# Patient Record
Sex: FEMALE | Race: WHITE | Employment: UNEMPLOYED | URBAN - METROPOLITAN AREA
[De-identification: names, ages, dates, MRNs, and addresses within clinical notes are randomized per-mention and may not be internally consistent; named-entity substitution may affect disease eponyms.]

---

## 2023-08-22 ENCOUNTER — HOSPITAL ENCOUNTER (OUTPATIENT)
Age: 68
Discharge: HOME | End: 2023-08-22
Payer: COMMERCIAL

## 2023-08-22 ENCOUNTER — APPOINTMENT (OUTPATIENT)
Dept: GENERAL RADIOLOGY | Age: 68
End: 2023-08-22
Attending: PHYSICIAN ASSISTANT
Payer: COMMERCIAL

## 2023-08-22 VITALS
SYSTOLIC BLOOD PRESSURE: 150 MMHG | TEMPERATURE: 99 F | WEIGHT: 195 LBS | RESPIRATION RATE: 18 BRPM | HEART RATE: 72 BPM | DIASTOLIC BLOOD PRESSURE: 61 MMHG | BODY MASS INDEX: 31.34 KG/M2 | HEIGHT: 66 IN | OXYGEN SATURATION: 97 %

## 2023-08-22 DIAGNOSIS — S83.91XA SPRAIN OF RIGHT KNEE, UNSPECIFIED LIGAMENT, INITIAL ENCOUNTER: Primary | ICD-10-CM

## 2023-08-22 PROCEDURE — 99203 OFFICE O/P NEW LOW 30 MIN: CPT

## 2023-08-22 PROCEDURE — 99204 OFFICE O/P NEW MOD 45 MIN: CPT

## 2023-08-22 PROCEDURE — 73562 X-RAY EXAM OF KNEE 3: CPT | Performed by: PHYSICIAN ASSISTANT

## 2023-08-22 RX ORDER — METHYLPREDNISOLONE 4 MG/1
TABLET ORAL
Qty: 1 EACH | Refills: 0 | Status: SHIPPED | OUTPATIENT
Start: 2023-08-22

## 2023-08-23 NOTE — ED INITIAL ASSESSMENT (HPI)
Pt here w/ c/o right knee pain onset lat year after there was a \"pop\" but today pt having difficulty ambulating due to pain.

## 2024-11-15 ENCOUNTER — HOSPITAL ENCOUNTER (OUTPATIENT)
Dept: MRI IMAGING | Age: 69
Discharge: HOME | End: 2024-11-15
Attending: INTERNAL MEDICINE
Payer: MEDICARE

## 2024-11-15 DIAGNOSIS — M19.90 OA (OSTEOARTHRITIS): ICD-10-CM

## 2024-11-15 DIAGNOSIS — M51.369 DDD (DEGENERATIVE DISC DISEASE), LUMBAR: ICD-10-CM

## 2024-11-15 DIAGNOSIS — M54.50 LOW BACK PAIN: ICD-10-CM

## 2024-11-15 PROCEDURE — 72148 MRI LUMBAR SPINE W/O DYE: CPT | Performed by: INTERNAL MEDICINE

## 2025-04-16 ENCOUNTER — OFFICE VISIT (OUTPATIENT)
Dept: NEUROLOGY | Facility: CLINIC | Age: 70
End: 2025-04-16
Payer: MEDICARE

## 2025-04-16 VITALS
WEIGHT: 216 LBS | SYSTOLIC BLOOD PRESSURE: 128 MMHG | RESPIRATION RATE: 16 BRPM | BODY MASS INDEX: 35 KG/M2 | HEART RATE: 88 BPM | DIASTOLIC BLOOD PRESSURE: 78 MMHG

## 2025-04-16 DIAGNOSIS — M54.16 LEFT LUMBAR RADICULITIS: Primary | ICD-10-CM

## 2025-04-16 DIAGNOSIS — G56.22 CUBITAL TUNNEL SYNDROME ON LEFT: ICD-10-CM

## 2025-04-16 DIAGNOSIS — M47.816 LUMBAR SPONDYLOSIS: ICD-10-CM

## 2025-04-16 PROCEDURE — 99204 OFFICE O/P NEW MOD 45 MIN: CPT | Performed by: OTHER

## 2025-04-16 RX ORDER — DULOXETIN HYDROCHLORIDE 30 MG/1
CAPSULE, DELAYED RELEASE ORAL
Qty: 60 CAPSULE | Refills: 1 | Status: SHIPPED | OUTPATIENT
Start: 2025-04-16

## 2025-04-16 RX ORDER — CELECOXIB 200 MG/1
200 CAPSULE ORAL DAILY
COMMUNITY

## 2025-04-16 NOTE — PATIENT INSTRUCTIONS
Refill policies:    Allow 2-3 business days for refills; controlled substances may take longer.  Contact your pharmacy at least 5 days prior to running out of medication and have them send an electronic request or submit request through the “request refill” option in your [facility] account.  Refills are not addressed on weekends; covering physicians do not authorize routine medications on weekends.  No narcotics or controlled substances are refilled after noon on Fridays or by on call physicians.  By law, narcotics must be electronically prescribed.  A 30 day supply with no refills is the maximum allowed.  If your prescription is due for a refill, you may be due for a follow up appointment.  To best provide you care, patients receiving routine medications need to be seen at least once a year.  Patients receiving narcotic/controlled substance medications need to be seen at least once every 3 months.  In the event that your preferred pharmacy does not have the requested medication in stock (e.g. Backordered), it is your responsibility to find another pharmacy that has the requested medication available.  We will gladly send a new prescription to that pharmacy at your request.    Scheduling Tests:    If your physician has ordered radiology tests such as MRI or CT scans, please contact Central Scheduling at [phone] right away to schedule the test.  Once scheduled, the [facility] Centralized Referral Team will work with your insurance carrier to obtain pre-certification or prior authorization.  Depending on your insurance carrier, approval may take 3-10 days.  It is highly recommended patients assure they have received an authorization before having a test performed.  If test is done without insurance authorization, patient may be responsible for the entire amount billed.      Precertification and Prior Authorizations:  If your physician has recommended that you have a procedure or additional testing performed the [facility]  Centralized Referral Team will contact your insurance carrier to obtain pre-certification or prior authorization.    You are strongly encouraged to contact your insurance carrier to verify that your procedure/test has been approved and is a COVERED benefit.  Although the [facility] Centralized Referral Team does its due diligence, the insurance carrier gives the disclaimer that \"Although the procedure is authorized, this does not guarantee payment.\"    Ultimately the patient is responsible for payment.   Thank you for your understanding in this matter.  Paperwork Completion:  If you require FMLA or disability paperwork for your recovery, please make sure to either drop it off or have it faxed to our office at [phone]. Be sure the form has your name and date of birth on it.  The form will be faxed to our Forms Department and they will complete it for you.  There is a 25$ fee for all forms that need to be filled out.  Please be aware there is a 10-14 day turnaround time.  You will need to sign a release of information (MINNA) form if your paperwork does not come with one.  You may call the Forms Department with any questions at [phone].  Their fax number is [phone].          Continue gabapentin for 3 more weeks, and if tolerating cymbalta (duloxetine), then stop gabapentin      Good physical therapist at [facility] and joi Jacy Bowman      Tingling in 4th and 5th fingers and weakness    Ulnar neuropathy: cubital tunnel syndrome    Recommend:   Avoid bending elbows smaller than 90 degrees  Do not rest elbow on hard surfaces  elbow splint to be worn every night during sleep:  Go to [employer]  Type in \"elbow splint for cubital tunnel\" - search/pick, not one of blue velcro ones  OR  TYPE:  Elbow Brace for Comfortable Elbow Support - Adjustable Stabilizer to Keep Your Elbow Immobilized & Your Arm Straight with Two Removable Metal    OR  \"Everyday Medical Elbow Brace for Arthritis and Cubital Tunnel Syndrome I  Elbow Immobilizer Splint for Tennis Elbow I Stabilizer Support Splint with Removable Splint I Fits Both Arms I Unisex\"

## 2025-04-16 NOTE — PROGRESS NOTES
Chronic back pain, gradual onset. Numbness to lower back radiating down left leg. Has tried stretches and HEP daily. Has worked as a  throughout her life, cares for grandson (13 y/o) who is disabled). Would like to discuss Physical therapy

## 2025-04-16 NOTE — PROGRESS NOTES
[facility] - [facility]   Neurology    Vernell Pirce Patient Status:  No patient class for patient encounter    1955 MRN [id number]   Location [facility], [facility] PCP Jose Armando Staley MD              HPI:   Vernell Price is a(n) 69 year old female with sinus disease, who presents at the request of Jose Armando Staley MD for evaluation of low back pain and leg pain. Started having back pain several years ago. Is gaining weight due to steroids. Back pain is worsening, and she is having tingling in the LLE that radiates from the buttock to the distal foot. Occurs at night, as well as with prolonged standing or walking or sitting. Taking gabapentin 300mg nightly. Has been exercising on her own. Did PT 6 years ago, helped back, but a recent PT she stopped because felt it did not help, were using cupping. Back is worse than the tingling in the leg.         Pertinent imaging and laboratory work-up:   MRI L spine 2024  L3-4:  Diffuse disc bulge with endplate osteophytes.  Mild facet arthropathy with thickening of ligamentum flavum. There is no significant spinal canal or neural foraminal stenosis.     L4-5:  Diffuse disc bulge with endplate osteophytes.  Mild facet arthropathy with thickening of ligamentum flavum. There is no significant spinal canal or neural foraminal stenosis.     L5-S1:  Mild diffuse disc bulge with mild facet arthropathy. There is no significant spinal canal or neural foraminal stenosis.           Impression   CONCLUSION:       1. Multilevel degenerative changes lumbar spine as above without significant spinal canal stenosis at any level.     2. Mild right subarticular zone narrowing at L2-3.     3. Mild bilateral neural foraminal stenosis at L2-3.     4. Mild facet arthropathy throughout the lumbar spine as above.       Past Medical History:  Past Medical History[1]     Past Surgical History:  Past Surgical History[2]    Family  History:  family history includes Cancer (age of onset: 30) in her son; Diabetes in her brother and mother; Heart Disease in her father; Hypertension in her mother.      Social History:   reports that she quit smoking about 13 years ago. Her smoking use included cigarettes. She has never used smokeless tobacco. She reports current alcohol use. She reports that she does not use drugs.    Allergies:  Allergies[3]    MEDICATIONS:  Medications - Current[4]      Review of Systems:   A comprehensive 10 point review of systems was completed.     Pertinent positives and negatives noted in the HPI.         PHYSICAL EXAM:   Neurological Exam  Vitals  Vitals:    04/16/25 0953   BP: 128/78   Pulse: 88   Resp: 16     General Appearance: Patient is a 69 year old female in no acute distress  Cardiac: Normal rate & regular rhythm  Skin: There are no rashes or other skin lesions.  Musculoskeletal: There is no scoliosis, or joint deformities  Neurologic examination:  Mental status: Patient is alert, attentive, and oriented x 3. Language is coherent and fluent without aphasia. Memory, comprehension and ability to follow commands were intact.   Cranial nerves II-XII: Optic discs were sharp. Pupils were round and reacted to light. Extraocular movements were full. There was no face, jaw, palate or tongue weakness or atrophy. Facial sensation was normal. Hearing was grossly intact. Shoulder shrug was normal.   Motor exam revealed normal muscle bulk and tone. No atrophy or fasciculations. Manual muscle testing revealed MRC grade 5/5 strength throughout including proximal and distal muscles of the arms and legs.  Deep tendon reflexes were 2 at the biceps, brachioradialis, triceps, knee jerk, and ankle jerk. Plantar responses were flexor bilaterally.   Sensory exam revealed normal light touch perception. Vibratory perception and proprioception were intact at the toes. Pinprick and temperature were normal. Romberg sign was absent.  Complex  motor skills revealed normal coordination. Finger-nose-finger intact.   Gait was narrow and stable, was able to walk on heels, toes and tandem without any difficulty.     ASSESSMENT/ACTIVE PROBLEM LIST:     Encounter Diagnoses   Name Primary?    Left lumbar radiculitis Yes    Lumbar spondylosis     Cubital tunnel syndrome on left        Discussion/Plan:  Clinically left lower lumbar radiculitis, though no significant foraminal stenosis from L3-S1 on imaging, and no canal stenosis above  Discussed range of treatment including rehabiliation for purpuse of strengthening spinal, core and leg muscles to optimize spine mechanics, which can be very helpful for mild to moderate lumbar radiuclopathy, to opposite end of the treatment spectrum including surgical treatment   S/p PT, one prior was helpful, and one was not  Gabapentin has caused her weight gain, already taking tramadol daily for back pain and other joint pains  Recommend Pain evaluation for chronic back pain- patient would not like an injection at this time  Goal is weight loss and strengthening lumbar/abdominal/hip muscles first- prefers conservative  Start trial of cymbalta 30mg then 60mg. Continue gabapentin for 3 more weeks, and if tolerating cymbalta (duloxetine), then stop gabapentin  Start PT at [facility]  Discussed rare risk of serotonin syndrome    Left DI weakness- probable left cubital tunnel  Recommend:   Avoid bending elbows smaller than 90 degrees  Do not rest elbow on hard surfaces  elbow splint to be worn every night during sleep      History of bilateral CTS surgery  If hand paresthesias worsen, obtain EMG    Requested Prescriptions     Signed Prescriptions Disp Refills    DULoxetine (CYMBALTA) 30 MG Oral Cap DR Particles 60 capsule 1     Sig: Week 1-3: take 1 tablet daily, Week 4 and on: if tolerating, and if nerve pain is not improved, increase to 2 tablets daily          We discussed in depth regarding the diagnosis, prognosis, treatment. The  patient was given ample opportunity to ask questions. All questions and concerns were addressed.     Teagan Pretty DO  Neuromuscular and General Neurology  [facility]             [1]   Past Medical History:   Fibroids    Ovarian cyst   [2]   Past Surgical History:  Procedure Laterality Date    Forearm/wrist surgery unlisted  1992    Carpal tunnel    Hysteroscopy  2005    Fibroids    Leg/ankle surgery proc unlisted  1996    Sinus surgery        x5   [3]   Allergies  Allergen Reactions    Clarithromycin OTHER (SEE COMMENTS)     Nausea, metallic taste    Pcn [Bicillin C-R,] RASH    Ciprofloxacin RASH    Penicillins RASH    Sulfamethoxazole W/Trimethoprim RASH   [4]   Current Outpatient Medications:     celecoxib 200 MG Oral Cap, Take 1 capsule (200 mg total) by mouth in the morning., Disp: , Rfl:     DULoxetine (CYMBALTA) 30 MG Oral Cap DR Particles, Week 1-3: take 1 tablet daily, Week 4 and on: if tolerating, and if nerve pain is not improved, increase to 2 tablets daily, Disp: 60 capsule, Rfl: 1    Omeprazole 40 MG Oral Capsule Delayed Release, Take 1 capsule (40 mg total) by mouth daily., Disp: 90 capsule, Rfl: 3    traMADol 50 MG Oral Tab, Take 50 mg by mouth every 6 (six) hours as needed for Pain., Disp: , Rfl:     gabapentin 300 MG Oral Cap, Take 300 mg by mouth nightly., Disp: , Rfl:     triamterene-hydroCHLOROthiazide 37.5-25 MG Oral Cap, Take 1 capsule by mouth every morning., Disp: , Rfl:     dicyclomine 20 MG Oral Tab, Take 1 tablet (20 mg total) by mouth every morning before breakfast., Disp: 90 tablet, Rfl: 3    methylPREDNISolone (MEDROL) 4 MG Oral Tablet Therapy Pack, Dosepack: take as directed (Patient not taking: Reported on 4/16/2025), Disp: 1 each, Rfl: 0    ibuprofen 800 MG Oral Tab, Take 800 mg by mouth every 6 (six) hours as needed for Pain. (Patient not taking: Reported on 4/16/2025), Disp: , Rfl:

## 2025-05-05 ENCOUNTER — OFFICE VISIT (OUTPATIENT)
Dept: PHYSICAL THERAPY | Facility: HOSPITAL | Age: 70
End: 2025-05-05
Attending: Other
Payer: MEDICARE

## 2025-05-05 DIAGNOSIS — M54.16 LEFT LUMBAR RADICULITIS: Primary | ICD-10-CM

## 2025-05-05 PROCEDURE — 97110 THERAPEUTIC EXERCISES: CPT

## 2025-05-05 PROCEDURE — 97162 PT EVAL MOD COMPLEX 30 MIN: CPT

## 2025-05-07 ENCOUNTER — OFFICE VISIT (OUTPATIENT)
Dept: PHYSICAL THERAPY | Facility: HOSPITAL | Age: 70
End: 2025-05-07
Attending: Other
Payer: MEDICARE

## 2025-05-07 PROCEDURE — 97112 NEUROMUSCULAR REEDUCATION: CPT

## 2025-05-07 PROCEDURE — 97110 THERAPEUTIC EXERCISES: CPT

## 2025-05-07 PROCEDURE — 97140 MANUAL THERAPY 1/> REGIONS: CPT

## 2025-05-07 NOTE — PROGRESS NOTES
Patient: Vernell Price (69 year old, female) Referring Provider:  Insurance:   Diagnosis: Left lumbar radiculitis (M54.16) Catherine Stachnik MEDICARE   Date of Surgery: No data recorded Next MD visit:  COMMERCIAL   Precautions:  None 9/17/2025 Referral Information:    Date of Evaluation: Req: 0, Auth: 0, Exp:     05/05/25 POC Auth Visits:  12       Today's Date   5/7/2025    Subjective  Sore in bilateral hamstrings and gluteal muscles. Burning sensation in the upper gluts. Tried exercises: relieve on the back but sore in the hip/thigh muscles.       Pain: 4/10     Objective  Myofascial restrictions R > L lumbar and thoracic paraspinals. Stiffness R thoracic spine with PA mobility.          Assessment  Presents with myofascial restrictions, segmental hypomobility of lumbar and thoracic spine, lower abdominal weakness and movement coordination impairments. Previously, she had much success with e-stime treatment. Attempted to apply e-stim today but out of the electrodes; will apply next session if able.    Goals (to be met in 10 visits)      Not Met Progress Toward Partially Met Met   Pt will improve transversus abdominis recruitment to perform proper isometric contraction without requiring verbal or tactile cuing to promote advancement of therex. [] [] [] []   Pt will demonstrate good understanding of proper posture and body mechanics to decrease pain and improve spinal safety. [] [] [] []   Pt will improve lumbar spine AROM flexion to full levels to allow increased ease with bending forward to don shoes. [] [] [] []   Pt will report improved symptom centralization and absence of radicular symptoms for 7 consecutive days to improve function with ADLs. [] [] [] []   Pt will have decreased paraspinal mm tension to tolerate standing >60 minutes for work and home activities. [] [] [] []   Pt will demonstrate improved core strength to be able to perform lifting and carrying with <2/10 pain. [] [] [] []   Pt will be  independent and compliant with comprehensive HEP to maintain progress achieved in PT [] [] [] []     Pt will improve hip ABD and ER strength to 4/5 to increase ease with standing and walking. [] [] [] []              Plan  per POC    Treatment Last 4 Visits  Treatment Day: 2       5/5/2025 5/7/2025   Spine Treatment   Therapeutic Exercise Pt education on neutral spine positioning in standing and in supine, diaphragmatic breathing. Advised to stop double leg lifts and flutters due to insufficient lower abdominal strength resulting in excessive lumbar extension and compression.  Prone press ups x 10  Quadruped neutral spine  Quadruped posterior rocking  Quadruped arm raises  Camel Cat     Neuro Re-Education  ADIM with tactile and verbal cuing  - same with single march  - same with 90-90 hold   Manual Therapy  STM to L/R lumbar and thoracic paraspinal mm  Quad stretch in prone B   Therapeutic Exercise Minutes 15 15   Neuro Re-Educ Minutes  12   Manual Therapy Minutes  15   Total Time Of Timed Procedures 15 42   Total Time Of Service-Based Procedures 0 0   Total Treatment Time 15 42   HEP Access Code: [email or web address]  URL: [email or web address]  Date: 05/05/2025  Prepared by: Jacy Bowman    Exercises  - Supine Hamstring Stretch with Strap  - 1 x daily - 7 x weekly - 2-3 sets - 10 reps  - Supine Sciatic Nerve Glide  - 1 x daily - 7 x weekly - 2-3 sets - 10 reps  - Supine Lower Trunk Rotation  - 1 x daily - 7 x weekly - 2-3 sets - 10 reps  - Sidelying Open Book Thoracic Lumbar Rotation and Extension  - 1 x daily - 7 x weekly - 2-3 sets - 10 reps  - Sidelying Open Book Thoracic Lumbar Rotation and Extension  - 1 x daily - 7 x weekly - 2-3 sets - 10 reps  - Supine March  - 1 x daily - 7 x weekly - 2-3 sets - 10 reps         HEP  Access Code: [email or web address]  URL: [email or web address]  Date: 05/05/2025  Prepared by: Jacy Bowman    Exercises  - Supine Hamstring Stretch with Strap  - 1 x daily -  7 x weekly - 2-3 sets - 10 reps  - Supine Sciatic Nerve Glide  - 1 x daily - 7 x weekly - 2-3 sets - 10 reps  - Supine Lower Trunk Rotation  - 1 x daily - 7 x weekly - 2-3 sets - 10 reps  - Sidelying Open Book Thoracic Lumbar Rotation and Extension  - 1 x daily - 7 x weekly - 2-3 sets - 10 reps  - Sidelying Open Book Thoracic Lumbar Rotation and Extension  - 1 x daily - 7 x weekly - 2-3 sets - 10 reps  - Supine March  - 1 x daily - 7 x weekly - 2-3 sets - 10 reps    Charges      Man x 1, te x 1, re-ed x 1

## 2025-05-07 NOTE — PROGRESS NOTES
SPINE EVALUATION:     Diagnosis:   Left lumbar radiculitis (M54.16) Patient:  Vernell Price (69 year old, female)        Referring Provider: Nataly Pretty  Today's Date   5/7/2025    Precautions:  None   Date of Evaluation: 05/05/25  Next MD visit: 9/17/2025  Date of Surgery: No data recorded     PATIENT SUMMARY   Summary of chief complaints: chronic lower back pain with intermittent left LE numbness radiating into the foot and toes  History of current condition: Chronic lower back pain from neck to bottom. Sttates she has disc herniations and OA in her spine. Constantly in pain and does some stretches that help some. Getting more difficulty with prolonged standing, sitting, laying down, walking. Lower back issues started after the menopause and have been getting worse. She was in physical therapy about 10 years ago. Intermittent numbness in left LE that goes all the way down to her toes. She feels worse when laying down and it is hard for her to get comfortable; usually falls asleep on her sides. She feels better with the rotational stretches and finds relief in her lower back after doing some double leg lifts and flutters. Takes Tramadol daily 15mg 4x/day. Gabapentin. BP medication. If not taking Tramadol in the mornings, she is so stiff that she can barely walk.   Pain level: current 7 /10 (across the lower back and between the shoulder blades), at best 3 /10, at worst 9 /10  Description of symptoms: chronic lower back pain with intermittent left LE numbness radiating into the foot and toes   Occupation: retired   Leisure activities/Hobbies: Lives in a long term community; has swimming and sauna available to her; exercises and walks; works in the yard and does housework   Prior level of function: Lives in a long term community; has swimming and sauna available to her; exercises and walks; works in the yard and does housework  Current limitations: prolonged sitting, standing, walking. Getting  comfortable in bed.  Pt goals:    Red flag signs/symptoms: Pt denies dizziness, drop attacks, dysphagia, dysarthria, diplopia; Pt denies changes in bowel/bladder function, saddle anesthesia; Pt denies pain that wakes in sleep, fever, recent trauma, history of CA, pain unchanged with movement/activity    Past medical history was reviewed with Vernell.  Significant findings include: R  meniscus surgery; R ankle surgery; CTS on left side 25 years ago  Imaging/Tests: MRI lumbar spine 11/15/2024: CONCLUSION:     1. Multilevel degenerative changes lumbar spine as above without significant spinal canal stenosis at any level.   2. Mild right subarticular zone narrowing at L2-3.   3. Mild bilateral neural foraminal stenosis at L2-3.   4. Mild facet arthropathy throughout the lumbar spine as above.   Vernell  has a past medical history of Fibroids and Ovarian cyst.  She  has a past surgical history that includes sinus surgery  ; leg/ankle surgery proc unlisted (1996); forearm/wrist surgery unlisted (1992); and hysteroscopy (2005).    ASSESSMENT  Vernell presents to physical therapy evaluation with primary c/o chronic lower back pain with intermittent left LE numbness radiating into the foot and toes. The results of the objective tests and measures show myofascial restrictions, segmental hypomobility of lumbar and thoracic spine, flexibility deficits, sub-optimal lumbar-pelvic movement pattern, weakness of abdominals and hip abductors and extensors.. Functional deficits include but are not limited to prolonged sitting, standing, walking. Getting comfortable in bed.. Signs and symptoms are consistent with diagnosis of Left lumbar radiculitis (M54.16). Pt and PT discussed evaluation findings, pathology, POC and HEP.  Pt voiced understanding and performs HEP correctly without reported pain. Skilled Physical Therapy is medically necessary to address the above impairments and reach functional goals.    OBJECTIVE:     Musculoskeletal:  Observation/Posture: anterior pelvic tilt; increased thoracic kyphosis; decreased cervical lordosis   Accessory Motion: Hypomobility central L5/S1 and L4/5. Hypermobility central L2/3 and L3/4.   Palpation: Increased tone lumbar paraspinal muscles L > R and thoracic paraspinal muscles R > L. Noted to be quite tender to palpation over right thoracic paraspinals and over facet joints of right thoracic spine T10-CT junction.     Special tests:   Slump: (-) B. SLR: (-) B.      ROM and Strength:  (* denotes performed with pain)  Trunk ROM     Flex full, feels stiff     Ext WNL, pain across mid-lumbar spine    R L     Side bend WNL WNL     Rotation WNL WNL   ,   Hip   ROM MMT (-/5)    R L R L     Flex (L2) 100 100 4 4     Ext      4- 4-     Abd     4- 3+     ER 45 45         IR 30 30        ,   Knee   ROM MMT (-/5)    R L R L     Flex 120 120 5 5     Ext (L3) 0 0 5 5     ,   Myotome MMT   MMT (-/5)    R L   Hip Flex (L2) 4 4   Knee Ext (L3) 5 5   Ankle DF (L4) 5 5   Ankle PF (S1) 5 5   Grt Toe Ext (L5) 5 5       Flexibility:  LE Flexibility R L     Hip Flexor mod restricted mod restricted     Hamstrings mod restricted mod restricted     ITB mod restricted mod restricted     Piriformis min restricted min restricted     Quads min restricted mod restricted     Gastroc-soleus min restricted min restricted       Neurological:  Sensation: grossly intact to light touch YUE UE/LE     Deep Tendon Reflexes: grossly intact YUE UE/LE     UMN:      Olson's: negative     Clonus: negative     Babinski: negative     Peripheral Neurodynamic: WNL except     Balance and Functional Mobility:  Gait: pt ambulates on level ground with normal mechanics.  Balance: SLS: EO R  , EO L            Today's Treatment and Response:   Pt education was provided on exam findings, treatment diagnosis, treatment plan, expectations, and prognosis.  Today's Treatment       5/5/2025   Spine Treatment   Therapeutic Exercise Pt education on  neutral spine positioning in standing and in supine, diaphragmatic breathing. Advised to stop double leg lifts and flutters due to insufficient lower abdominal strength resulting in excessive lumbar extension and compression.    Therapeutic Exercise Minutes 15   Total Time Of Timed Procedures 15   Total Time Of Service-Based Procedures 0   Total Treatment Time 15   HEP Access Code: [email or web address]  URL: [email or web address]  Date: 05/05/2025  Prepared by: Jacy Bowman    Exercises  - Supine Hamstring Stretch with Strap  - 1 x daily - 7 x weekly - 2-3 sets - 10 reps  - Supine Sciatic Nerve Glide  - 1 x daily - 7 x weekly - 2-3 sets - 10 reps  - Supine Lower Trunk Rotation  - 1 x daily - 7 x weekly - 2-3 sets - 10 reps  - Sidelying Open Book Thoracic Lumbar Rotation and Extension  - 1 x daily - 7 x weekly - 2-3 sets - 10 reps  - Sidelying Open Book Thoracic Lumbar Rotation and Extension  - 1 x daily - 7 x weekly - 2-3 sets - 10 reps  - Supine March  - 1 x daily - 7 x weekly - 2-3 sets - 10 reps        Patient was instructed in and issued a HEP for: Access Code: [email or web address]  URL: [email or web address]  Date: 05/05/2025  Prepared by: Jacy Bowman    Exercises  - Supine Hamstring Stretch with Strap  - 1 x daily - 7 x weekly - 2-3 sets - 10 reps  - Supine Sciatic Nerve Glide  - 1 x daily - 7 x weekly - 2-3 sets - 10 reps  - Supine Lower Trunk Rotation  - 1 x daily - 7 x weekly - 2-3 sets - 10 reps  - Sidelying Open Book Thoracic Lumbar Rotation and Extension  - 1 x daily - 7 x weekly - 2-3 sets - 10 reps  - Sidelying Open Book Thoracic Lumbar Rotation and Extension  - 1 x daily - 7 x weekly - 2-3 sets - 10 reps  - Supine March  - 1 x daily - 7 x weekly - 2-3 sets - 10 reps    Charges:  PT EVAL: Moderate Complexity,  TE x 1  In agreement with evaluation findings and clinical rationale, this evaluation involved MODERATE COMPLEXITY decision making due to 1-2 personal factors/comorbidities, 3  or more body structures involved/activity limitations, and evolving symptoms as documented in the evaluation.                                                                         PLAN OF CARE:    Goals: (to be met in 10 visits)    Not Met Progress Toward Partially Met Met   Pt will improve transversus abdominis recruitment to perform proper isometric contraction without requiring verbal or tactile cuing to promote advancement of therex. [] [] [] []   Pt will demonstrate good understanding of proper posture and body mechanics to decrease pain and improve spinal safety. [] [] [] []   Pt will improve lumbar spine AROM flexion to full levels to allow increased ease with bending forward to don shoes. [] [] [] []   Pt will report improved symptom centralization and absence of radicular symptoms for 7 consecutive days to improve function with ADLs. [] [] [] []   Pt will have decreased paraspinal mm tension to tolerate standing >60 minutes for work and home activities. [] [] [] []   Pt will demonstrate improved core strength to be able to perform lifting and carrying with <2/10 pain. [] [] [] []   Pt will be independent and compliant with comprehensive HEP to maintain progress achieved in PT [] [] [] []     Pt will improve hip ABD and ER strength to 4/5 to increase ease with standing and walking. [] [] [] []          Frequency / Duration: Patient will be seen 2x/week or a total of 10  visits over a 90 day period. Treatment will include: Manual Therapy; Neuromuscular Re-education; Therapeutic Activities; Therapeutic Exercise; Home Exercise Program instruction; Electrical stimulation (unattended)    Education or treatment limitation: None   Rehab Potential: good     Oswestry Disability Index Score  Score: (Patient-Rptd) 30 % (4/29/2025 11:10 AM)      Patient/Family/Caregiver was advised of these findings, precautions, and treatment options and has agreed to actively participate in planning and for this course of  care.    Thank you for your referral. Please co-sign or sign and return this letter via fax as soon as possible to [phone]. If you have any questions, please contact me at Dept: [phone]    Sincerely,  Electronically signed by therapist: Jacy Bowman PT  Physician's certification required: Yes  I certify the need for these services furnished under this plan of treatment and while under my care.    X___________________________________________________ Date____________________    Certification From: 5/7/2025  To:8/5/2025

## 2025-05-12 ENCOUNTER — APPOINTMENT (OUTPATIENT)
Dept: PHYSICAL THERAPY | Facility: HOSPITAL | Age: 70
End: 2025-05-12
Attending: Other
Payer: MEDICARE

## 2025-05-22 ENCOUNTER — APPOINTMENT (OUTPATIENT)
Dept: PHYSICAL THERAPY | Facility: HOSPITAL | Age: 70
End: 2025-05-22
Attending: Other
Payer: MEDICARE

## 2025-05-22 PROCEDURE — 97110 THERAPEUTIC EXERCISES: CPT

## 2025-05-22 PROCEDURE — 97140 MANUAL THERAPY 1/> REGIONS: CPT

## 2025-05-22 NOTE — PROGRESS NOTES
Patient: Vernell Price (69 year old, female) Referring Provider:  Insurance:   Diagnosis: Left lumbar radiculitis (M54.16) [facility] Stachnik MEDICARE   Date of Surgery: No data recorded Next MD visit:  COMMERCIAL   Precautions:  None 9/17/2025 Referral Information:    Date of Evaluation: Req: 0, Auth: 0, Exp:     05/05/25 POC Auth Visits:  12       Today's Date   5/22/2025    Subjective  Pt reports pain between shoulder blades and low back. No numbness/tingling       Pain: 5/10     Objective  Pt demonstrated difficulty with posterior pelvic tilts and needed verbal/tactile cues. Posture corrected with pt instructed to avoid thrusting chest forward.            Assessment  Pt reported zero pain at end of treatment. Therapist reinforced importance of maintaining good posture and work on strengthening abdominals.    Goals (to be met in 10 visits)     Not Met Progress Toward Partially Met Met    Pt will improve transversus abdominis recruitment to perform proper isometric contraction without requiring verbal or tactile cuing to promote advancement of therex. []  []  []  []    Pt will demonstrate good understanding of proper posture and body mechanics to decrease pain and improve spinal safety. []  []  []  []    Pt will improve lumbar spine AROM flexion to full levels to allow increased ease with bending forward to don shoes. []  []  []  []    Pt will report improved symptom centralization and absence of radicular symptoms for 7 consecutive days to improve function with ADLs. []  []  []  []    Pt will have decreased paraspinal mm tension to tolerate standing >60 minutes for work and home activities. []  []  []  []    Pt will demonstrate improved core strength to be able to perform lifting and carrying with <2/10 pain. []  []  []  []    Pt will be independent and compliant with comprehensive HEP to maintain progress achieved in PT []  []  []  []       Pt will improve hip ABD and ER strength to 4/5 to increase ease with  standing and walking. []  []  []  []       Plan  Continue PT per poc    Treatment Last 4 Visits  Treatment Day: 3       5/5/2025 5/7/2025 5/22/2025   Spine Treatment   Therapeutic Exercise Pt education on neutral spine positioning in standing and in supine, diaphragmatic breathing. Advised to stop double leg lifts and flutters due to insufficient lower abdominal strength resulting in excessive lumbar extension and compression.  Prone press ups x 10  Quadruped neutral spine  Quadruped posterior rocking  Quadruped arm raises  Camel Cat   Nustep level 5 x 5 minutes  In H/L with wedge under pelvis: posterior pelvic tilts, towel assisted DKTC  H/L anti rotation trunk isometrics against blue band   Seated on Swiss ball: assisted posterior pelvic tilts then lateral pelvic tilts, pelvic rotation cw and ccw   Neuro Re-Education  ADIM with tactile and verbal cuing  - same with single march  - same with 90-90 hold    Manual Therapy  STM to L/R lumbar and thoracic paraspinal mm  Quad stretch in prone B STM to thoracic and LS areas using foam roll  Prone hip rotation and quads stretches   Therapeutic Exercise Minutes 15 15 30   Neuro Re-Educ Minutes  12    Manual Therapy Minutes  15 15   Total Time Of Timed Procedures 15 42 45   Total Time Of Service-Based Procedures 0 0 0   Total Treatment Time 15 42 45   HEP Access Code: [email or web address]  URL: [email or web address]  Date: 05/05/2025  Prepared by: Jacy Bowman    Exercises  - Supine Hamstring Stretch with Strap  - 1 x daily - 7 x weekly - 2-3 sets - 10 reps  - Supine Sciatic Nerve Glide  - 1 x daily - 7 x weekly - 2-3 sets - 10 reps  - Supine Lower Trunk Rotation  - 1 x daily - 7 x weekly - 2-3 sets - 10 reps  - Sidelying Open Book Thoracic Lumbar Rotation and Extension  - 1 x daily - 7 x weekly - 2-3 sets - 10 reps  - Sidelying Open Book Thoracic Lumbar Rotation and Extension  - 1 x daily - 7 x weekly - 2-3 sets - 10 reps  - Supine March  - 1 x daily - 7 x weekly  - 2-3 sets - 10 reps  Access Code: [email or web address]  URL: [email or web address]  Date: 05/22/2025  Prepared by: Nora Annaacruz    Exercises  - Supine Posterior Pelvic Tilt  - 2 x daily - 7 x weekly - 1 sets - 10 reps        HEP  Access Code: [email or web address]  URL: [email or web address]/  Date: 05/22/2025  Prepared by: Nora Ferrer    Exercises  - Supine Posterior Pelvic Tilt  - 2 x daily - 7 x weekly - 1 sets - 10 reps    Charges  1MT 2TE

## 2025-05-29 ENCOUNTER — OFFICE VISIT (OUTPATIENT)
Dept: PHYSICAL THERAPY | Facility: HOSPITAL | Age: 70
End: 2025-05-29
Attending: Other
Payer: MEDICARE

## 2025-05-29 PROCEDURE — 97110 THERAPEUTIC EXERCISES: CPT

## 2025-05-29 PROCEDURE — 97140 MANUAL THERAPY 1/> REGIONS: CPT

## 2025-05-29 NOTE — PROGRESS NOTES
Patient: Vernell Price (69 year old, female) Referring Provider:  Insurance:   Diagnosis: Left lumbar radiculitis (M54.16) Catherine Stachnik MEDICARE   Date of Surgery: No data recorded Next MD visit:  COMMERCIAL   Precautions:  None 9/17/2025 Referral Information:    Date of Evaluation: Req: 0, Auth: 0, Exp:     05/05/25 POC Auth Visits:  12       Today's Date   5/29/2025    Subjective  Pt feels better, numbness on L leg has resolved these past few days.       Pain: 2/10     Objective  Pt needed verbal and tactile cues to activate the lower abs. She difficulty tilting pelvis to neutral. See flow sheet            Assessment  Therapist instructed pt on neutral spine positioning, avoiding arching the back and she verbalized understanding. She is progressing well, reporting resolution of LLE numbness and lower pain level, would benefit from continued PT.    Goals (to be met in 10 visits)     Not Met Progress Toward Partially Met Met    Pt will improve transversus abdominis recruitment to perform proper isometric contraction without requiring verbal or tactile cuing to promote advancement of therex. []  []  []  []    Pt will demonstrate good understanding of proper posture and body mechanics to decrease pain and improve spinal safety. []  []  []  []    Pt will improve lumbar spine AROM flexion to full levels to allow increased ease with bending forward to don shoes. []  []  []  []    Pt will report improved symptom centralization and absence of radicular symptoms for 7 consecutive days to improve function with ADLs. []  []  []  []    Pt will have decreased paraspinal mm tension to tolerate standing >60 minutes for work and home activities. []  []  []  []    Pt will demonstrate improved core strength to be able to perform lifting and carrying with <2/10 pain. []  []  []  []    Pt will be independent and compliant with comprehensive HEP to maintain progress achieved in PT []  []  []  []       Pt will improve hip ABD  and ER strength to 4/5 to increase ease with standing and walking. []  []  []  []           Plan  Continue PT per poc    Treatment Last 4 Visits  Treatment Day: 4       5/5/2025 5/7/2025 5/22/2025 5/29/2025   Spine Treatment   Therapeutic Exercise Pt education on neutral spine positioning in standing and in supine, diaphragmatic breathing. Advised to stop double leg lifts and flutters due to insufficient lower abdominal strength resulting in excessive lumbar extension and compression.  Prone press ups x 10  Quadruped neutral spine  Quadruped posterior rocking  Quadruped arm raises  Camel Cat   Nustep level 5 x 5 minutes  In H/L with wedge under pelvis: posterior pelvic tilts, towel assisted DKTC  H/L anti rotation trunk isometrics against blue band   Seated on Swiss ball: assisted posterior pelvic tilts then lateral pelvic tilts, pelvic rotation cw and ccw Nustep level 5 x 5 minutes  Standing hamstrings and stretches, calf stretches on rocker board  HL on wedge: posterior pelvic tilt, SKTC stretches  Lower trunk rotation and piriformis stretches  With red band around thighs in hooklying: bridging exercises  H/L resisted hip flexion exercises   H/L in neutral spine: anti rotation trunk rotation isometrics    Neuro Re-Education  ADIM with tactile and verbal cuing  - same with single march  - same with 90-90 hold     Manual Therapy  STM to L/R lumbar and thoracic paraspinal mm  Quad stretch in prone B STM to thoracic and LS areas using foam roll  Prone hip rotation and quads stretches STM to LB in prone using foam roll ff by hip rotation and quads stretches   Therapeutic Exercise Minutes 15 15 30 30   Neuro Re-Educ Minutes  12     Manual Therapy Minutes  15 15 15   Total Time Of Timed Procedures 15 42 45 45   Total Time Of Service-Based Procedures 0 0 0 0   Total Treatment Time 15 42 45 45   HEP Access Code: [email or web address]  URL: [email or web address]  Date: 05/05/2025  Prepared by: Jacy  Zaborina    Exercises  - Supine Hamstring Stretch with Strap  - 1 x daily - 7 x weekly - 2-3 sets - 10 reps  - Supine Sciatic Nerve Glide  - 1 x daily - 7 x weekly - 2-3 sets - 10 reps  - Supine Lower Trunk Rotation  - 1 x daily - 7 x weekly - 2-3 sets - 10 reps  - Sidelying Open Book Thoracic Lumbar Rotation and Extension  - 1 x daily - 7 x weekly - 2-3 sets - 10 reps  - Sidelying Open Book Thoracic Lumbar Rotation and Extension  - 1 x daily - 7 x weekly - 2-3 sets - 10 reps  - Supine March  - 1 x daily - 7 x weekly - 2-3 sets - 10 reps  Access Code: [email or web address]  URL: [email or web address]  Date: 05/22/2025  Prepared by: Nora Ferrer    Exercises  - Supine Posterior Pelvic Tilt  - 2 x daily - 7 x weekly - 1 sets - 10 reps Posture education: avoid arching the back when standing. Be aware of sitting position avoiding anterior pelvic tilt. She verbalized understanding.        HEP  Posture education: avoid arching the back when standing. Be aware of sitting position avoiding anterior pelvic tilt. She verbalized understanding.    Charges  1MT 2TE

## 2025-06-02 ENCOUNTER — OFFICE VISIT (OUTPATIENT)
Dept: PHYSICAL THERAPY | Facility: HOSPITAL | Age: 70
End: 2025-06-02
Attending: Other
Payer: MEDICARE

## 2025-06-02 PROCEDURE — 97112 NEUROMUSCULAR REEDUCATION: CPT

## 2025-06-02 PROCEDURE — 97140 MANUAL THERAPY 1/> REGIONS: CPT

## 2025-06-02 PROCEDURE — 97110 THERAPEUTIC EXERCISES: CPT

## 2025-06-02 NOTE — PROGRESS NOTES
Patient: Vernell Price (69 year old, female) Referring Provider:  Insurance:   Diagnosis: Left lumbar radiculitis (M54.16) Catherine Stachnik MEDICARE   Date of Surgery: No data recorded Next MD visit:  COMMERCIAL   Precautions:  None 9/17/2025 Referral Information:    Date of Evaluation: Req: 0, Auth: 0, Exp:     05/05/25 POC Auth Visits:  12       Today's Date   6/2/2025    Subjective  Improving. L LE Numbness is less frequent. When it happens, she does her stretches and it goes away. No pain but heaviness/stiffness all over the body.       Pain: 0/10     Objective  Pt needed verbal and tactile cues to activate the lower abs. She has difficulty tilting pelvis to neutral. See flow sheet for tx detail.          Assessment  Improving with good reduction in numbness episodes. HEP is helping. Focused on diaphragmatic breathing and prolonged exhalation to faciliate reduced lumbar spine extension and ribcage lowering. Pt continues to experience difficulty moving out of APT and achieving neutral spine position.    Goals (to be met in 10 visits)      Not Met Progress Toward Partially Met Met   Pt will improve transversus abdominis recruitment to perform proper isometric contraction without requiring verbal or tactile cuing to promote advancement of therex. [] [] [] []   Pt will demonstrate good understanding of proper posture and body mechanics to decrease pain and improve spinal safety. [] [] [] []   Pt will improve lumbar spine AROM flexion to full levels to allow increased ease with bending forward to don shoes. [] [] [] []   Pt will report improved symptom centralization and absence of radicular symptoms for 7 consecutive days to improve function with ADLs. [] [] [] []   Pt will have decreased paraspinal mm tension to tolerate standing >60 minutes for work and home activities. [] [] [] []   Pt will demonstrate improved core strength to be able to perform lifting and carrying with <2/10 pain. [] [] [] []   Pt will  be independent and compliant with comprehensive HEP to maintain progress achieved in PT [] [] [] []     Pt will improve hip ABD and ER strength to 4/5 to increase ease with standing and walking. [] [] [] []                  Plan  Continue PT per poc    Treatment Last 4 Visits  Treatment Day: 5 5/7/2025 5/22/2025 5/29/2025 6/2/2025   Spine Treatment   Therapeutic Exercise Prone press ups x 10  Quadruped neutral spine  Quadruped posterior rocking  Quadruped arm raises  Camel Cat   Nustep level 5 x 5 minutes  In H/L with wedge under pelvis: posterior pelvic tilts, towel assisted DKTC  H/L anti rotation trunk isometrics against blue band   Seated on Swiss ball: assisted posterior pelvic tilts then lateral pelvic tilts, pelvic rotation cw and ccw Nustep level 5 x 5 minutes  Standing hamstrings and stretches, calf stretches on rocker board  HL on wedge: posterior pelvic tilt, SKTC stretches  Lower trunk rotation and piriformis stretches  With red band around thighs in hooklying: bridging exercises  H/L resisted hip flexion exercises   H/L in neutral spine: anti rotation trunk rotation isometrics  Sciatic nerve glides  Bridges  Squats with SB at the wall   Clam shells     Neuro Re-Education ADIM with tactile and verbal cuing  - same with single march  - same with 90-90 hold   DB in supine with TC and VC  DB in standing  Back to wall posture  PPT in hook-lying  With marching   Manual Therapy STM to L/R lumbar and thoracic paraspinal mm  Quad stretch in prone B STM to thoracic and LS areas using foam roll  Prone hip rotation and quads stretches STM to LB in prone using foam roll ff by hip rotation and quads stretches Prone STM L lumbar and R thoracic paraspinal mm  CPA Gr III L5/S1 and L4/5  LPA Gr III lumbar spine  Lumbar-rotational mobilization in sdly B   Therapeutic Exercise Minutes 15 30 30 10   Neuro Re-Educ Minutes 12   15   Manual Therapy Minutes 15 15 15 20   Total Time Of Timed Procedures 42 45 45 45   Total  Time Of Service-Based Procedures 0 0 0 0   Total Treatment Time 42 45 45 45   HEP  Access Code: [email or web address]  URL: [email or web address]  Date: 05/22/2025  Prepared by: Nora Ferrer    Exercises  - Supine Posterior Pelvic Tilt  - 2 x daily - 7 x weekly - 1 sets - 10 reps Posture education: avoid arching the back when standing. Be aware of sitting position avoiding anterior pelvic tilt. She verbalized understanding.         HEP  Posture education: avoid arching the back when standing. Be aware of sitting position avoiding anterior pelvic tilt. She verbalized understanding.    Charges  man x 1, TE x 1, re-ed x 1

## 2025-06-04 ENCOUNTER — OFFICE VISIT (OUTPATIENT)
Dept: PHYSICAL THERAPY | Facility: HOSPITAL | Age: 70
End: 2025-06-04
Attending: Other
Payer: MEDICARE

## 2025-06-04 PROCEDURE — 97140 MANUAL THERAPY 1/> REGIONS: CPT

## 2025-06-04 PROCEDURE — 97110 THERAPEUTIC EXERCISES: CPT

## 2025-06-04 NOTE — PROGRESS NOTES
Patient: Vernell Price (69 year old, female) Referring Provider:  Insurance:   Diagnosis: Left lumbar radiculitis (M54.16) Catherine Stachnik MEDICARE   Date of Surgery: No data recorded Next MD visit:  COMMERCIAL   Precautions:  None 9/17/2025 Referral Information:    Date of Evaluation: Req: 0, Auth: 0, Exp:     05/05/25 POC Auth Visits:  12       Today's Date   6/4/2025    Subjective  She is doing better. No episodes of numbness since last session. Feels tired.       Pain: 0/10     Objective  Pt needed verbal and tactile cues to activate the lower abs. She has difficulty tilting pelvis to neutral. See flow sheet for tx detail.          Assessment  Demonstrates better ability to achieve neutral spine positioning but still with moderate amount of cuing required. Progressed hip and core stabilization today.    Goals (to be met in 10 visits)      Not Met Progress Toward Partially Met Met   Pt will improve transversus abdominis recruitment to perform proper isometric contraction without requiring verbal or tactile cuing to promote advancement of therex. [] [] [] []   Pt will demonstrate good understanding of proper posture and body mechanics to decrease pain and improve spinal safety. [] [] [] []   Pt will improve lumbar spine AROM flexion to full levels to allow increased ease with bending forward to don shoes. [] [] [] []   Pt will report improved symptom centralization and absence of radicular symptoms for 7 consecutive days to improve function with ADLs. [] [] [] []   Pt will have decreased paraspinal mm tension to tolerate standing >60 minutes for work and home activities. [] [] [] []   Pt will demonstrate improved core strength to be able to perform lifting and carrying with <2/10 pain. [] [] [] []   Pt will be independent and compliant with comprehensive HEP to maintain progress achieved in PT [] [] [] []     Pt will improve hip ABD and ER strength to 4/5 to increase ease with standing and walking. [] [] []  []                      Plan  Continue PT per poc. Anticipate d/c this session.    Treatment Last 4 Visits  Treatment Day: 6 5/22/2025 5/29/2025 6/2/2025 6/4/2025   Spine Treatment   Therapeutic Exercise Nustep level 5 x 5 minutes  In H/L with wedge under pelvis: posterior pelvic tilts, towel assisted DKTC  H/L anti rotation trunk isometrics against blue band   Seated on Swiss ball: assisted posterior pelvic tilts then lateral pelvic tilts, pelvic rotation cw and ccw Nustep level 5 x 5 minutes  Standing hamstrings and stretches, calf stretches on rocker board  HL on wedge: posterior pelvic tilt, SKTC stretches  Lower trunk rotation and piriformis stretches  With red band around thighs in hooklying: bridging exercises  H/L resisted hip flexion exercises   H/L in neutral spine: anti rotation trunk rotation isometrics  Sciatic nerve glides  Bridges  Squats with SB at the wall   Clam shells   Sciatic nerve glides   Bridges x 12  Bridge with SB x 12  Squats with SB at the wall   Clam shells BTB x 20 L/R, cuing on PPT  DKTC stretch 30 sec  Isometric abdominals in dead bug position  Isometric abdominals diagonal in dead bug position  Dead Bug, legs only, partial extensions  Seated on SB marching  Square tilt board calf stretch  Red TB lateral steps x 20 L/R  Blue TB B shoulder extension x 15, cuing on neutral spine  Blue TB OH pull x 10, cuing on neutral spine   Neuro Re-Education   DB in supine with TC and VC  DB in standing  Back to wall posture  PPT in hook-lying  With marching --   Manual Therapy STM to thoracic and LS areas using foam roll  Prone hip rotation and quads stretches STM to LB in prone using foam roll ff by hip rotation and quads stretches Prone STM L lumbar and R thoracic paraspinal mm  CPA Gr III L5/S1 and L4/5  LPA Gr III lumbar spine  Lumbar-rotational mobilization in sdly B Prone STM L lumbar and R thoracic paraspinal mm   CPA Gr III L5/S1 and L4/5   LPA Gr III lumbar spine   Lumbar-rotational  mobilization in sdly B      Therapeutic Exercise Minutes 30 30 10 30   Neuro Re-Educ Minutes   15    Manual Therapy Minutes 15 15 20 12   Total Time Of Timed Procedures 45 45 45 42   Total Time Of Service-Based Procedures 0 0 0 0   Total Treatment Time 45 45 45 42   HEP Access Code: [email or web address]  URL: [email or web address]  Date: 05/22/2025  Prepared by: Nora Ferrer    Exercises  - Supine Posterior Pelvic Tilt  - 2 x daily - 7 x weekly - 1 sets - 10 reps Posture education: avoid arching the back when standing. Be aware of sitting position avoiding anterior pelvic tilt. She verbalized understanding.          HEP  Posture education: avoid arching the back when standing. Be aware of sitting position avoiding anterior pelvic tilt. She verbalized understanding.    Charges  TE x 2, man x 1

## 2025-06-09 ENCOUNTER — OFFICE VISIT (OUTPATIENT)
Dept: PHYSICAL THERAPY | Facility: HOSPITAL | Age: 70
End: 2025-06-09
Attending: Other
Payer: MEDICARE

## 2025-06-09 PROCEDURE — 97110 THERAPEUTIC EXERCISES: CPT

## 2025-06-09 PROCEDURE — 97140 MANUAL THERAPY 1/> REGIONS: CPT

## 2025-06-09 NOTE — PROGRESS NOTES
Patient: Vernell Price (69 year old, female) Referring Provider:  Insurance:   Diagnosis: Left lumbar radiculitis (M54.16) Catherine Stachnik MEDICARE   Date of Surgery: No data recorded Next MD visit:  COMMERCIAL   Precautions:  None 9/17/2025 Referral Information:    Date of Evaluation: Req: 0, Auth: 0, Exp:     05/05/25 POC Auth Visits:  12       Today's Date   6/9/2025    Subjective  She is doing better overall. Exercises help. Feels most discomfort when laying down and it is difficult to rest and get comfortable. Has been sore all over with burning in the lower back yesterday. Her  is having knee surgery tomorrow and she will need to take care of him for the next 2-3 weeks.       Pain: 0/10     Objective   See flowchart for tx details.     Assessment  STM and TrP release over right posterior hip provokes R LE numbness and \"jumping\"/jerking leg sensations. Demonstrates better ability to achieve neutral spine positioning but still with moderate amount of cuing required. Progressed hip and core stabilization today. Recommended to continue with PT as patient is making good progress but still pain and core muscles strength deficits. Pt will continue with HEP for the next 2-3 weeks and will return to to PT after.    Goals (to be met in 10 visits)      Not Met Progress Toward Partially Met Met   Pt will improve transversus abdominis recruitment to perform proper isometric contraction without requiring verbal or tactile cuing to promote advancement of therex. [] [] [] []   Pt will demonstrate good understanding of proper posture and body mechanics to decrease pain and improve spinal safety. [] [] [] []   Pt will improve lumbar spine AROM flexion to full levels to allow increased ease with bending forward to don shoes. [] [] [] []   Pt will report improved symptom centralization and absence of radicular symptoms for 7 consecutive days to improve function with ADLs. [] [] [] []   Pt will have decreased  paraspinal mm tension to tolerate standing >60 minutes for work and home activities. [] [] [] []   Pt will demonstrate improved core strength to be able to perform lifting and carrying with <2/10 pain. [] [] [] []   Pt will be independent and compliant with comprehensive HEP to maintain progress achieved in PT [] [] [] []     Pt will improve hip ABD and ER strength to 4/5 to increase ease with standing and walking. [] [] [] []                          Plan  continue with hip/core strengthening; flexibility program; STM as needed    Treatment Last 4 Visits  Treatment Day: 7 5/29/2025 6/2/2025 6/4/2025 6/9/2025   Spine Treatment   Therapeutic Exercise Nustep level 5 x 5 minutes  Standing hamstrings and stretches, calf stretches on rocker board  HL on wedge: posterior pelvic tilt, SKTC stretches  Lower trunk rotation and piriformis stretches  With red band around thighs in hooklying: bridging exercises  H/L resisted hip flexion exercises   H/L in neutral spine: anti rotation trunk rotation isometrics  Sciatic nerve glides  Bridges  Squats with SB at the wall   Clam shells   Sciatic nerve glides   Bridges x 12  Bridge with SB x 12  Squats with SB at the wall   Clam shells BTB x 20 L/R, cuing on PPT  DKTC stretch 30 sec  Isometric abdominals in dead bug position  Isometric abdominals diagonal in dead bug position  Dead Bug, legs only, partial extensions  Seated on SB marching  Square tilt board calf stretch  Red TB lateral steps x 20 L/R  Blue TB B shoulder extension x 15, cuing on neutral spine  Blue TB OH pull x 10, cuing on neutral spine Bridge x 20  Green TB clam shells x 20 L/R  Sciatic nerve glides x 20 L/R  Fig 4 stretch with c/l knee to chest 3 x 30 sec L/R  Dead bug isometrics diagonal 5 x 10 sec L/R  Camel Cat  Bird Dog  HEP update   Neuro Re-Education  DB in supine with TC and VC  DB in standing  Back to wall posture  PPT in hook-lying  With marching --    Manual Therapy STM to LB in prone using foam  roll ff by hip rotation and quads stretches Prone STM L lumbar and R thoracic paraspinal mm  CPA Gr III L5/S1 and L4/5  LPA Gr III lumbar spine  Lumbar-rotational mobilization in sdly B Prone STM L lumbar and R thoracic paraspinal mm   CPA Gr III L5/S1 and L4/5   LPA Gr III lumbar spine   Lumbar-rotational mobilization in sdly B    L long axis traction 20 sec x 5  Lumbar-rotational mobilization in neutral Gr III L/R  STM to L posterior hip with TrP release (SI ligaments, TFL, glut med)  EOB hip flexors stretch B with manual assist   Therapeutic Exercise Minutes 30 10 30 23   Neuro Re-Educ Minutes  15     Manual Therapy Minutes 15 20 12 20   Total Time Of Timed Procedures 45 45 42 43   Total Time Of Service-Based Procedures 0 0 0 0   Total Treatment Time 45 45 42 43   HEP Posture education: avoid arching the back when standing. Be aware of sitting position avoiding anterior pelvic tilt. She verbalized understanding.   Access Code: [email or web address]  URL: [email or web address]  Date: 06/09/2025  Prepared by: Jacy Bowman    Exercises  - Supine Lower Trunk Rotation  - 1 x daily - 7 x weekly - 2-3 sets - 10 reps  - Clamshell with Resistance  - 1 x daily - 7 x weekly - 2-3 sets - 10 reps  - Supine Bridge  - 1 x daily - 7 x weekly - 2-3 sets - 10 reps  - Supine Figure 4 Piriformis Stretch  - 1 x daily - 7 x weekly - 1 sets - 3 reps - 30 sec hold  - Hip Flexor Stretch at Edge of Bed  - 1 x daily - 7 x weekly - 1 sets - 3 reps - 30 sec hold  - Cat Cow  - 1 x daily - 7 x weekly - 2-3 sets - 10 reps  - Bird Dog  - 1 x daily - 7 x weekly - 2-3 sets - 10 reps  - Supine Posterior Pelvic Tilt  - 1 x daily - 7 x weekly - 2-3 sets - 10 reps  - Supine March  - 1 x daily - 7 x weekly - 2-3 sets - 10 reps        HEP  Access Code: [email or web address]  URL: [email or web address]  Date: 06/09/2025  Prepared by: Jacy Bowman    Exercises  - Supine Lower Trunk Rotation  - 1 x daily - 7 x weekly - 2-3 sets - 10 reps  -  Clamshell with Resistance  - 1 x daily - 7 x weekly - 2-3 sets - 10 reps  - Supine Bridge  - 1 x daily - 7 x weekly - 2-3 sets - 10 reps  - Supine Figure 4 Piriformis Stretch  - 1 x daily - 7 x weekly - 1 sets - 3 reps - 30 sec hold  - Hip Flexor Stretch at Edge of Bed  - 1 x daily - 7 x weekly - 1 sets - 3 reps - 30 sec hold  - Cat Cow  - 1 x daily - 7 x weekly - 2-3 sets - 10 reps  - Bird Dog  - 1 x daily - 7 x weekly - 2-3 sets - 10 reps  - Supine Posterior Pelvic Tilt  - 1 x daily - 7 x weekly - 2-3 sets - 10 reps  - Supine March  - 1 x daily - 7 x weekly - 2-3 sets - 10 reps    Charges  TE x 2, man x 1

## 2025-07-08 ENCOUNTER — OFFICE VISIT (OUTPATIENT)
Dept: PHYSICAL THERAPY | Facility: HOSPITAL | Age: 70
End: 2025-07-08
Attending: Other
Payer: MEDICARE

## 2025-07-08 PROCEDURE — 97110 THERAPEUTIC EXERCISES: CPT

## 2025-07-08 PROCEDURE — 97140 MANUAL THERAPY 1/> REGIONS: CPT

## 2025-07-08 NOTE — PROGRESS NOTES
Patient: Vernell Price (69 year old, female) Referring Provider:  Insurance:   Diagnosis: Left lumbar radiculitis (M54.16) No ref. provider found  MEDICARE   Date of Surgery: No data recorded Next MD visit:  COMMERCIAL   Precautions:  None 9/17/2025 Referral Information:    Date of Evaluation: Req: 0, Auth: 0, Exp:     05/05/25 POC Auth Visits:  12       Today's Date   7/8/2025    Subjective  Last 3 weeks were rough due to assisting  who had knee surgery. Struggles to sit too long and lay down for too long. Pain at night is the worst. Exercises and stretches help.       Pain: 6/10     Objective  Myofascial restrictions L posterior hip. APT in standing/supine. SLR: (-) B        Assessment  Continued with mobility and stretching program focused on spinal lengthening and neutral pelvic control with good tolerance. Added seated Fig 4 stretch and open book stretch with SLR for pain/symptoms relief at home.    Goals (to be met in 10 visits)      Not Met Progress Toward Partially Met Met   Pt will improve transversus abdominis recruitment to perform proper isometric contraction without requiring verbal or tactile cuing to promote advancement of therex. [] [] [] []   Pt will demonstrate good understanding of proper posture and body mechanics to decrease pain and improve spinal safety. [] [] [] []   Pt will improve lumbar spine AROM flexion to full levels to allow increased ease with bending forward to don shoes. [] [] [] []   Pt will report improved symptom centralization and absence of radicular symptoms for 7 consecutive days to improve function with ADLs. [] [] [] []   Pt will have decreased paraspinal mm tension to tolerate standing >60 minutes for work and home activities. [] [] [] []   Pt will demonstrate improved core strength to be able to perform lifting and carrying with <2/10 pain. [] [] [] []   Pt will be independent and compliant with comprehensive HEP to maintain progress achieved in PT [] [] [] []      Pt will improve hip ABD and ER strength to 4/5 to increase ease with standing and walking. [] [] [] []                              Plan  continue with hip/core strengthening; flexibility program; STM as needed    Treatment Last 4 Visits  Treatment Day: 8       6/2/2025 6/4/2025 6/9/2025 7/8/2025   Spine Treatment   Therapeutic Exercise Sciatic nerve glides  Bridges  Squats with SB at the wall   Clam shells   Sciatic nerve glides   Bridges x 12  Bridge with SB x 12  Squats with SB at the wall   Clam shells BTB x 20 L/R, cuing on PPT  DKTC stretch 30 sec  Isometric abdominals in dead bug position  Isometric abdominals diagonal in dead bug position  Dead Bug, legs only, partial extensions  Seated on SB marching  Square tilt board calf stretch  Red TB lateral steps x 20 L/R  Blue TB B shoulder extension x 15, cuing on neutral spine  Blue TB OH pull x 10, cuing on neutral spine Bridge x 20  Green TB clam shells x 20 L/R  Sciatic nerve glides x 20 L/R  Fig 4 stretch with c/l knee to chest 3 x 30 sec L/R  Dead bug isometrics diagonal 5 x 10 sec L/R  Camel Cat  Bird Dog  HEP update Bridge x 20   Open book with L SLR 3 x 20 sec  Blue TB clam shells x 20 L/R   Sciatic nerve glides x 20 L/R   Fig 4 stretch with c/l knee to chest 3 x 30 sec L/R   Thoracic spine extension with foam roll 2 levels, multiple bouts  Supported bridge  DKTC  Happy baby  Seated Fig4 stretch L/R  HEP update      Neuro Re-Education DB in supine with TC and VC  DB in standing  Back to wall posture  PPT in hook-lying  With marching --     Manual Therapy Prone STM L lumbar and R thoracic paraspinal mm  CPA Gr III L5/S1 and L4/5  LPA Gr III lumbar spine  Lumbar-rotational mobilization in sdly B Prone STM L lumbar and R thoracic paraspinal mm   CPA Gr III L5/S1 and L4/5   LPA Gr III lumbar spine   Lumbar-rotational mobilization in sdly B    L long axis traction 20 sec x 5  Lumbar-rotational mobilization in neutral Gr III L/R  STM to L posterior hip with  TrP release (SI ligaments, TFL, glut med)  EOB hip flexors stretch B with manual assist L long axis traction 20 sec x 5   Lumbar-rotational mobilization in neutral Gr III L/R   STM to L posterior hip with TrP release (SI ligaments, TFL, glut med)   EOB hip flexors stretch B with manual assist   Prone sacral distraction Gr III 5 x 20 sec   Therapeutic Exercise Minutes 10 30 23 30   Neuro Re-Educ Minutes 15      Manual Therapy Minutes 20 12 20 15   Total Time Of Timed Procedures 45 42 43 45   Total Time Of Service-Based Procedures 0 0 0 0   Total Treatment Time 45 42 43 45   HEP   Access Code: [email or web address]  URL: [email or web address]  Date: 06/09/2025  Prepared by: Jacy Bowman    Exercises  - Supine Lower Trunk Rotation  - 1 x daily - 7 x weekly - 2-3 sets - 10 reps  - Clamshell with Resistance  - 1 x daily - 7 x weekly - 2-3 sets - 10 reps  - Supine Bridge  - 1 x daily - 7 x weekly - 2-3 sets - 10 reps  - Supine Figure 4 Piriformis Stretch  - 1 x daily - 7 x weekly - 1 sets - 3 reps - 30 sec hold  - Hip Flexor Stretch at Edge of Bed  - 1 x daily - 7 x weekly - 1 sets - 3 reps - 30 sec hold  - Cat Cow  - 1 x daily - 7 x weekly - 2-3 sets - 10 reps  - Bird Dog  - 1 x daily - 7 x weekly - 2-3 sets - 10 reps  - Supine Posterior Pelvic Tilt  - 1 x daily - 7 x weekly - 2-3 sets - 10 reps  - Supine March  - 1 x daily - 7 x weekly - 2-3 sets - 10 reps         HEP  Access Code: [email or web address]  URL: [email or web address]  Date: 06/09/2025  Prepared by: Jacy Bowman    Exercises  - Supine Lower Trunk Rotation  - 1 x daily - 7 x weekly - 2-3 sets - 10 reps  - Clamshell with Resistance  - 1 x daily - 7 x weekly - 2-3 sets - 10 reps  - Supine Bridge  - 1 x daily - 7 x weekly - 2-3 sets - 10 reps  - Supine Figure 4 Piriformis Stretch  - 1 x daily - 7 x weekly - 1 sets - 3 reps - 30 sec hold  - Hip Flexor Stretch at Edge of Bed  - 1 x daily - 7 x weekly - 1 sets - 3 reps - 30 sec hold  - Cat Cow  - 1  x daily - 7 x weekly - 2-3 sets - 10 reps  - Bird Dog  - 1 x daily - 7 x weekly - 2-3 sets - 10 reps  - Supine Posterior Pelvic Tilt  - 1 x daily - 7 x weekly - 2-3 sets - 10 reps  - Supine March  - 1 x daily - 7 x weekly - 2-3 sets - 10 reps    Charges  TE x 2, man x 1

## 2025-07-15 ENCOUNTER — APPOINTMENT (OUTPATIENT)
Dept: PHYSICAL THERAPY | Facility: HOSPITAL | Age: 70
End: 2025-07-15
Attending: Other
Payer: MEDICARE

## 2025-07-16 ENCOUNTER — OFFICE VISIT (OUTPATIENT)
Dept: PHYSICAL THERAPY | Facility: HOSPITAL | Age: 70
End: 2025-07-16
Attending: Other
Payer: MEDICARE

## 2025-07-16 PROCEDURE — 97110 THERAPEUTIC EXERCISES: CPT

## 2025-07-16 PROCEDURE — 97140 MANUAL THERAPY 1/> REGIONS: CPT

## 2025-07-16 NOTE — PROGRESS NOTES
Patient: Vernell Price (69 year old, female) Referring Provider:  Insurance:   Diagnosis: Left lumbar radiculitis (M54.16) No ref. provider found  MEDICARE   Date of Surgery: No data recorded Next MD visit:  COMMERCIAL   Precautions:  None 9/17/2025 Referral Information:    Date of Evaluation: Req: 0, Auth: 0, Exp:     05/05/25 POC Auth Visits:  10       Today's Date   7/16/2025    Subjective  Lower back pain toward the end of the day. Lower back stiffness in the mornings. She has been taking care of her 15 yo grandson who is disabled. Currently without the pain.       Pain: 0/10 (currently)     Objective          See flowchart for tx details.      Assessment  Limited by right knee pain today with the quadruped position (R kneeling) and with the squats. Discontinued the exercise and worked on medial patellar glides to address the tightness, KT applied for proprioceptive feedback. Demonstrates very good response to manual and stretching techniques to address chronic lower back pain. No longer with the reports of left LE pain, cramps or mm spasms.    Goals (to be met in 10 visits)      Not Met Progress Toward Partially Met Met   Pt will improve transversus abdominis recruitment to perform proper isometric contraction without requiring verbal or tactile cuing to promote advancement of therex. [] [] [] []   Pt will demonstrate good understanding of proper posture and body mechanics to decrease pain and improve spinal safety. [] [] [] []   Pt will improve lumbar spine AROM flexion to full levels to allow increased ease with bending forward to don shoes. [] [] [] []   Pt will report improved symptom centralization and absence of radicular symptoms for 7 consecutive days to improve function with ADLs. [] [] [] []   Pt will have decreased paraspinal mm tension to tolerate standing >60 minutes for work and home activities. [] [] [] []   Pt will demonstrate improved core strength to be able to perform lifting and carrying  with <2/10 pain. [] [] [] []   Pt will be independent and compliant with comprehensive HEP to maintain progress achieved in PT [] [] [] []     Pt will improve hip ABD and ER strength to 4/5 to increase ease with standing and walking. [] [] [] []                                  Plan  continue with hip/core strengthening; flexibility program; STM as needed. Re-assess    Treatment Last 4 Visits  Treatment Day: 9 6/4/2025 6/9/2025 7/8/2025 7/16/2025   Spine Treatment   Therapeutic Exercise Sciatic nerve glides   Bridges x 12  Bridge with SB x 12  Squats with SB at the wall   Clam shells BTB x 20 L/R, cuing on PPT  DKTC stretch 30 sec  Isometric abdominals in dead bug position  Isometric abdominals diagonal in dead bug position  Dead Bug, legs only, partial extensions  Seated on SB marching  Square tilt board calf stretch  Red TB lateral steps x 20 L/R  Blue TB B shoulder extension x 15, cuing on neutral spine  Blue TB OH pull x 10, cuing on neutral spine Bridge x 20  Green TB clam shells x 20 L/R  Sciatic nerve glides x 20 L/R  Fig 4 stretch with c/l knee to chest 3 x 30 sec L/R  Dead bug isometrics diagonal 5 x 10 sec L/R  Camel Cat  Bird Dog  HEP update Bridge x 20   Open book with L SLR 3 x 20 sec  Blue TB clam shells x 20 L/R   Sciatic nerve glides x 20 L/R   Fig 4 stretch with c/l knee to chest 3 x 30 sec L/R   Thoracic spine extension with foam roll 2 levels, multiple bouts  Supported bridge  DKTC  Happy baby  Seated Fig4 stretch L/R  HEP update    Bridge x 20   Blue TB clam shells x 20 L/R   Sciatic nerve glides x 20 L/R   Fig 4 stretch with c/l knee to chest 3 x 30 sec L/R   Thoracic spine extension with foam roll 2 levels, multiple bouts   Supported bridge   DKTC   Happy baby   Seated Fig4 stretch L/R   Bird Dog x 5 L/R discontinued due to R knee pain   Child's pose  Squats with SB at the wall - discontinued due to R knee pain     Neuro Re-Education --      Manual Therapy Prone STM L lumbar and R  thoracic paraspinal mm   CPA Gr III L5/S1 and L4/5   LPA Gr III lumbar spine   Lumbar-rotational mobilization in sdly B    L long axis traction 20 sec x 5  Lumbar-rotational mobilization in neutral Gr III L/R  STM to L posterior hip with TrP release (SI ligaments, TFL, glut med)  EOB hip flexors stretch B with manual assist L long axis traction 20 sec x 5   Lumbar-rotational mobilization in neutral Gr III L/R   STM to L posterior hip with TrP release (SI ligaments, TFL, glut med)   EOB hip flexors stretch B with manual assist   Prone sacral distraction Gr III 5 x 20 sec L long axis traction 20 sec x 5   Lumbar-rotational mobilization in neutral Gr III L/R   STM to L posterior hip with TrP release (SI ligaments, TFL, glut med)   EOB hip flexors stretch B with manual assist   Prone sacral distraction Gr III 5 x 20 sec   R patellar glides, medial  KT application R knee     Therapeutic Exercise Minutes 30 23 30 25   Manual Therapy Minutes 12 20 15 18   Total Time Of Timed Procedures 42 43 45 43   Total Time Of Service-Based Procedures 0 0 0 0   Total Treatment Time 42 43 45 43   HEP  Access Code: [email or web address]  URL: [email or web address]  Date: 06/09/2025  Prepared by: Jacy Bowman    Exercises  - Supine Lower Trunk Rotation  - 1 x daily - 7 x weekly - 2-3 sets - 10 reps  - Clamshell with Resistance  - 1 x daily - 7 x weekly - 2-3 sets - 10 reps  - Supine Bridge  - 1 x daily - 7 x weekly - 2-3 sets - 10 reps  - Supine Figure 4 Piriformis Stretch  - 1 x daily - 7 x weekly - 1 sets - 3 reps - 30 sec hold  - Hip Flexor Stretch at Edge of Bed  - 1 x daily - 7 x weekly - 1 sets - 3 reps - 30 sec hold  - Cat Cow  - 1 x daily - 7 x weekly - 2-3 sets - 10 reps  - Bird Dog  - 1 x daily - 7 x weekly - 2-3 sets - 10 reps  - Supine Posterior Pelvic Tilt  - 1 x daily - 7 x weekly - 2-3 sets - 10 reps  - Supine March  - 1 x daily - 7 x weekly - 2-3 sets - 10 reps          HEP  Access Code: [email or web address]  URL:  [email or web address]  Date: 06/09/2025  Prepared by: Jacy Bowman    Exercises  - Supine Lower Trunk Rotation  - 1 x daily - 7 x weekly - 2-3 sets - 10 reps  - Clamshell with Resistance  - 1 x daily - 7 x weekly - 2-3 sets - 10 reps  - Supine Bridge  - 1 x daily - 7 x weekly - 2-3 sets - 10 reps  - Supine Figure 4 Piriformis Stretch  - 1 x daily - 7 x weekly - 1 sets - 3 reps - 30 sec hold  - Hip Flexor Stretch at Edge of Bed  - 1 x daily - 7 x weekly - 1 sets - 3 reps - 30 sec hold  - Cat Cow  - 1 x daily - 7 x weekly - 2-3 sets - 10 reps  - Bird Dog  - 1 x daily - 7 x weekly - 2-3 sets - 10 reps  - Supine Posterior Pelvic Tilt  - 1 x daily - 7 x weekly - 2-3 sets - 10 reps  - Supine March  - 1 x daily - 7 x weekly - 2-3 sets - 10 reps    Charges  TE x 2, man x 1

## 2025-07-22 ENCOUNTER — OFFICE VISIT (OUTPATIENT)
Dept: PHYSICAL THERAPY | Facility: HOSPITAL | Age: 70
End: 2025-07-22
Attending: Other
Payer: MEDICARE

## 2025-07-22 PROCEDURE — 97140 MANUAL THERAPY 1/> REGIONS: CPT

## 2025-07-22 PROCEDURE — 97110 THERAPEUTIC EXERCISES: CPT

## 2025-07-22 NOTE — PROGRESS NOTES
Patient: Vernell Price (69 year old, female) Referring Provider:  Insurance:   Diagnosis: Left lumbar radiculitis (M54.16) No ref. provider found  MEDICARE   Date of Surgery: No data recorded Next MD visit:  COMMERCIAL   Precautions:  None 9/17/2025 Referral Information:    Date of Evaluation: Req: 0, Auth: 0, Exp:     05/05/25 POC Auth Visits:  10         Discharge Summary  Pt has attended 10 visits in Physical Therapy.     Subjective  She gets relief with the exercises and understands better what to do to alleviate her lower back pain and left sciatica. She reports fewer episodes of left lower extremtiy numbness. However, her pain is still intermittent and fluctuates from day to day; generally worsens toward the end of the day and at night.       Pain: 0/10     Oswestry Disability Index Score  Score: (Patient-Rptd) 30 % (4/29/2025 11:10 AM)    Post Oswestry Disability Index Score  Post Score: (Patient-Rptd) 36 % (7/22/2025  8:48 AM)    -6 % improvement         Assessment  Vernell completed 10 sessions of physical therapy with some improvement and good participation. She continues to experience intermittent lower back pain and left sciatica symptoms but states being able to manage them better with the prescribed exercises and stretches. She reports reduced occurence of left LE pain, cramping or mm spasms. Oswestry score of 36% indicates increase from the original score of 30% which may be due to patient's increased awareness into her limitations rather than actual decrease in functional mobility. Vernell has been extensively educated on the postural modificiations, stretching and \"opening\" exercises, body mechanics. Patient is discharged from physical therapy to continue with HEP.        Objective  SLR: (-) B. Slump: (-) B Lumbar ROM: full and pain-free. Single leg balance: R 15 sec; L: 25 sec.       Trunk       5/7/2025 7/22/2025   Trunk ROM/MMT   Trunk Flex full, feels stiff full   Trunk Extension WNL, pain  across mid-lumbar spine WNL   Trunk Rt Side Bend WNL WNL   Trunk Lt Side Bend WNL WNL   Trunk Rt Rotation WNL WNL   Trunk Lt Rotation WNL WNL   , Hip       5/7/2025 7/22/2025   Hip ROM/MMT   Rt Hip Flexion 100 120   Lt Hip Flexion 100 120   Rt Hip Flexion MMT (L2) 4 4+   Lt Hip Flexion MMT (L2) 4 4+   Rt Hip Extension MMT 4- 4   Lt Hip Extension MMT 4- 4   Rt Hip Abduction MMT 4- 4   Lt Hip Abduction MMT 3+ 4   Rt Hip ER 45 45   Lt Hip ER 45 45   Rt Hip IR 30 30   Lt Hip IR 30 30   , LE Flexibility       5/7/2025 7/22/2025   LE Flexibility   Rt Hip Flexor mod restricted min restricted   Lt Hip Flexor mod restricted min restricted   Rt Hamstring mod restricted WNL   Lt Hamstring mod restricted WNL   Rt ITB mod restricted min restricted   Lt ITB mod restricted min restricted   Rt Piriformis min restricted WNL   Lt Piriformis min restricted WNL   Rt Quads min restricted min restricted   Lt Quads mod restricted min restricted   Rt Gastroc-Soleus min restricted min restricted   Lt Gastroc-Soleus min restricted min restricted          Goals (to be met in 10 visits)      Not Met Progress Toward Partially Met Met   Pt will improve transversus abdominis recruitment to perform proper isometric contraction without requiring verbal or tactile cuing to promote advancement of therex. [] [] [] [x]   Pt will demonstrate good understanding of proper posture and body mechanics to decrease pain and improve spinal safety. [] [] [] [x]   Pt will improve lumbar spine AROM flexion to full levels to allow increased ease with bending forward to don shoes. [] [] [] []   Pt will report improved symptom centralization and absence of radicular symptoms for 7 consecutive days to improve function with ADLs. [] [] [x] []   Pt will have decreased paraspinal mm tension to tolerate standing >60 minutes for work and home activities. [x] [] [] []   Pt will demonstrate improved core strength to be able to perform lifting and carrying with <2/10 pain. []  [] [] [x]   Pt will be independent and compliant with comprehensive HEP to maintain progress achieved in PT [] [] [] [x]     Pt will improve hip ABD and ER strength to 4/5 to increase ease with standing and walking. [] [] [] [x]             Patient/Family/Caregiver was advised of these findings, precautions, and treatment options and has agreed to actively participate in planning and for this course of care.    Thank you for your referral. If you have any questions, please contact me at Dept: [phone].    Sincerely,  Electronically signed by therapist: Jacy Bowman, PT                Treatment Last 4 Visits  Treatment Day: 10       6/9/2025 7/8/2025 7/16/2025 7/22/2025   Spine Treatment   Therapeutic Exercise Bridge x 20  Green TB clam shells x 20 L/R  Sciatic nerve glides x 20 L/R  Fig 4 stretch with c/l knee to chest 3 x 30 sec L/R  Dead bug isometrics diagonal 5 x 10 sec L/R  Camel Cat  Bird Dog  HEP update Bridge x 20   Open book with L SLR 3 x 20 sec  Blue TB clam shells x 20 L/R   Sciatic nerve glides x 20 L/R   Fig 4 stretch with c/l knee to chest 3 x 30 sec L/R   Thoracic spine extension with foam roll 2 levels, multiple bouts  Supported bridge  DKTC  Happy baby  Seated Fig4 stretch L/R  HEP update    Bridge x 20   Blue TB clam shells x 20 L/R   Sciatic nerve glides x 20 L/R   Fig 4 stretch with c/l knee to chest 3 x 30 sec L/R   Thoracic spine extension with foam roll 2 levels, multiple bouts   Supported bridge   DKTC   Happy baby   Seated Fig4 stretch L/R   Bird Dog x 5 L/R discontinued due to R knee pain   Child's pose  Squats with SB at the wall - discontinued due to R knee pain   Happy Baby pose 30 sec x 2  DKTC 30 sec  Fig 4 stretch with SB L/R  Supported bridge  Thoracic spine extension over foam roll  Green TB lateral steps   SLB L/R  Calf stretch with tilt board  HEP update/review   Manual Therapy L long axis traction 20 sec x 5  Lumbar-rotational mobilization in neutral Gr III L/R  STM to L  posterior hip with TrP release (SI ligaments, TFL, glut med)  EOB hip flexors stretch B with manual assist L long axis traction 20 sec x 5   Lumbar-rotational mobilization in neutral Gr III L/R   STM to L posterior hip with TrP release (SI ligaments, TFL, glut med)   EOB hip flexors stretch B with manual assist   Prone sacral distraction Gr III 5 x 20 sec L long axis traction 20 sec x 5   Lumbar-rotational mobilization in neutral Gr III L/R   STM to L posterior hip with TrP release (SI ligaments, TFL, glut med)   EOB hip flexors stretch B with manual assist   Prone sacral distraction Gr III 5 x 20 sec   R patellar glides, medial  KT application R knee   L long axis traction   Lumbar traction via both legs with SB  B piriformis stretch, manual   Therapeutic Exercise Minutes 23 30 25 30   Manual Therapy Minutes 20 15 18 15   Total Time Of Timed Procedures 43 45 43 45   Total Time Of Service-Based Procedures 0 0 0 0   Total Treatment Time 43 45 43 45   HEP Access Code: [email or web address]  URL: [email or web address]  Date: 06/09/2025  Prepared by: Jacy Bowman    Exercises  - Supine Lower Trunk Rotation  - 1 x daily - 7 x weekly - 2-3 sets - 10 reps  - Clamshell with Resistance  - 1 x daily - 7 x weekly - 2-3 sets - 10 reps  - Supine Bridge  - 1 x daily - 7 x weekly - 2-3 sets - 10 reps  - Supine Figure 4 Piriformis Stretch  - 1 x daily - 7 x weekly - 1 sets - 3 reps - 30 sec hold  - Hip Flexor Stretch at Edge of Bed  - 1 x daily - 7 x weekly - 1 sets - 3 reps - 30 sec hold  - Cat Cow  - 1 x daily - 7 x weekly - 2-3 sets - 10 reps  - Bird Dog  - 1 x daily - 7 x weekly - 2-3 sets - 10 reps  - Supine Posterior Pelvic Tilt  - 1 x daily - 7 x weekly - 2-3 sets - 10 reps  - Supine March  - 1 x daily - 7 x weekly - 2-3 sets - 10 reps           HEP  Access Code: [email or web address]  URL: [email or web address]  Date: 06/09/2025  Prepared by: Jacy Bowman    Exercises  - Supine Lower Trunk Rotation  - 1 x  daily - 7 x weekly - 2-3 sets - 10 reps  - Clamshell with Resistance  - 1 x daily - 7 x weekly - 2-3 sets - 10 reps  - Supine Bridge  - 1 x daily - 7 x weekly - 2-3 sets - 10 reps  - Supine Figure 4 Piriformis Stretch  - 1 x daily - 7 x weekly - 1 sets - 3 reps - 30 sec hold  - Hip Flexor Stretch at Edge of Bed  - 1 x daily - 7 x weekly - 1 sets - 3 reps - 30 sec hold  - Cat Cow  - 1 x daily - 7 x weekly - 2-3 sets - 10 reps  - Bird Dog  - 1 x daily - 7 x weekly - 2-3 sets - 10 reps  - Supine Posterior Pelvic Tilt  - 1 x daily - 7 x weekly - 2-3 sets - 10 reps  - Supine March  - 1 x daily - 7 x weekly - 2-3 sets - 10 reps    Charges  TE x 2 man x 1

## (undated) NOTE — LETTER
Patient Name: Vernell Price  YOB: 1955          MRN number:  [id number]  Date:  7/22/2025  Referring Physician:  Dr. Pretty    Discharge Summary  Initial Functional Outcome Score 30/100  Final Functional Outcome Score 36/100  Number of Visits Attended 10 in Physical Therapy    Dear Dr. Pretty,    Thank you for your referral to Physical Therapy. Below please find the discharge note for Vernell.    Discharge Summary  Pt has attended 10 visits in Physical Therapy.     Subjective  She gets relief with the exercises and understands better what to do to alleviate her lower back pain and left sciatica. She reports fewer episodes of left lower extremtiy numbness. However, her pain is still intermittent and fluctuates from day to day; generally worsens toward the end of the day and at night.       Pain: 0/10     Oswestry Disability Index Score  Score: (Patient-Rptd) 30 % (4/29/2025 11:10 AM)    Post Oswestry Disability Index Score  Post Score: (Patient-Rptd) 36 % (7/22/2025  8:48 AM)    -6 % improvement         Assessment  Vernell completed 10 sessions of physical therapy with some improvement and good participation. She continues to experience intermittent lower back pain and left sciatica symptoms but states being able to manage them better with the prescribed exercises and stretches. She reports reduced occurence of left LE pain, cramping or mm spasms. Oswestry score of 36% indicates increase from the original score of 30% which may be due to patient's increased awareness into her limitations rather than actual decrease in functional mobility. Vernell has been extensively educated on the postural modificiations, stretching and \"opening\" exercises, body mechanics. Patient is discharged from physical therapy to continue with HEP.        Objective  SLR: (-) B. Slump: (-) B Lumbar ROM: full and pain-free. Single leg balance: R 15 sec; L: 25 sec.       Trunk       5/7/2025 7/22/2025   Trunk ROM/MMT   Trunk  Flex full, feels stiff full   Trunk Extension WNL, pain across mid-lumbar spine WNL   Trunk Rt Side Bend WNL WNL   Trunk Lt Side Bend WNL WNL   Trunk Rt Rotation WNL WNL   Trunk Lt Rotation WNL WNL   , Hip       5/7/2025 7/22/2025   Hip ROM/MMT   Rt Hip Flexion 100 120   Lt Hip Flexion 100 120   Rt Hip Flexion MMT (L2) 4 4+   Lt Hip Flexion MMT (L2) 4 4+   Rt Hip Extension MMT 4- 4   Lt Hip Extension MMT 4- 4   Rt Hip Abduction MMT 4- 4   Lt Hip Abduction MMT 3+ 4   Rt Hip ER 45 45   Lt Hip ER 45 45   Rt Hip IR 30 30   Lt Hip IR 30 30   , LE Flexibility       5/7/2025 7/22/2025   LE Flexibility   Rt Hip Flexor mod restricted min restricted   Lt Hip Flexor mod restricted min restricted   Rt Hamstring mod restricted WNL   Lt Hamstring mod restricted WNL   Rt ITB mod restricted min restricted   Lt ITB mod restricted min restricted   Rt Piriformis min restricted WNL   Lt Piriformis min restricted WNL   Rt Quads min restricted min restricted   Lt Quads mod restricted min restricted   Rt Gastroc-Soleus min restricted min restricted   Lt Gastroc-Soleus min restricted min restricted          Goals (to be met in 10 visits)      Not Met Progress Toward Partially Met Met   Pt will improve transversus abdominis recruitment to perform proper isometric contraction without requiring verbal or tactile cuing to promote advancement of therex. [] [] [] [x]   Pt will demonstrate good understanding of proper posture and body mechanics to decrease pain and improve spinal safety. [] [] [] [x]   Pt will improve lumbar spine AROM flexion to full levels to allow increased ease with bending forward to don shoes. [] [] [] []   Pt will report improved symptom centralization and absence of radicular symptoms for 7 consecutive days to improve function with ADLs. [] [] [x] []   Pt will have decreased paraspinal mm tension to tolerate standing >60 minutes for work and home activities. [x] [] [] []   Pt will demonstrate improved core strength to be  able to perform lifting and carrying with <2/10 pain. [] [] [] [x]   Pt will be independent and compliant with comprehensive HEP to maintain progress achieved in PT [] [] [] [x]     Pt will improve hip ABD and ER strength to 4/5 to increase ease with standing and walking. [] [] [] [x]             Patient/Family/Caregiver was advised of these findings, precautions, and treatment options and has agreed to actively participate in planning and for this course of care.    Thank you for your referral. If you have any questions, please contact me at Dept: [phone].    Sincerely,  Electronically signed by therapist: Jacy Bowman, PT           21st Century Cures Act Notice to Patient: Medical documents like this are made available to patients in the interest of transparency. However, be advised this is a medical document and it is intended as drqq-ll-desa communication between your medical providers. This medical document may contain abbreviations, assessments, medical data, and results or other terms that are unfamiliar. Medical documents are intended to carry relevant information, facts as evident, and the clinical opinion of the practitioner. As such, this medical document may be written in language that appears blunt or direct. You are encouraged to contact your medical provider and/or Located within [facility] Patient Experience if you have any questions about this medical document.